# Patient Record
Sex: FEMALE | Race: WHITE | URBAN - NONMETROPOLITAN AREA
[De-identification: names, ages, dates, MRNs, and addresses within clinical notes are randomized per-mention and may not be internally consistent; named-entity substitution may affect disease eponyms.]

---

## 2020-10-27 ENCOUNTER — OFFICE VISIT (OUTPATIENT)
Dept: OBGYN CLINIC | Age: 30
End: 2020-10-27
Payer: COMMERCIAL

## 2020-10-27 VITALS
HEIGHT: 65 IN | BODY MASS INDEX: 34.49 KG/M2 | SYSTOLIC BLOOD PRESSURE: 102 MMHG | DIASTOLIC BLOOD PRESSURE: 60 MMHG | WEIGHT: 207 LBS

## 2020-10-27 PROCEDURE — 36415 COLL VENOUS BLD VENIPUNCTURE: CPT | Performed by: ADVANCED PRACTICE MIDWIFE

## 2020-10-27 PROCEDURE — 99203 OFFICE O/P NEW LOW 30 MIN: CPT | Performed by: ADVANCED PRACTICE MIDWIFE

## 2020-10-27 ASSESSMENT — ENCOUNTER SYMPTOMS
GASTROINTESTINAL NEGATIVE: 1
EYES NEGATIVE: 1
RESPIRATORY NEGATIVE: 1

## 2020-10-27 NOTE — PROGRESS NOTES
PROBLEM VISIT     Date of service: 10/27/2020    Cortney Hannah  Is a 26 y.o.  female    PT's PCP is: No primary care provider on file. : 1990                                          Review of Systems   Constitutional: Negative. HENT: Negative. Eyes: Negative. Respiratory: Negative. Cardiovascular: Negative. Gastrointestinal: Negative. Endocrine: Negative. Genitourinary: Negative. Musculoskeletal: Negative. Neurological: Negative. Psychiatric/Behavioral: Negative. Patient's last menstrual period was 10/09/2020. OB History    Para Term  AB Living   1       1     SAB TAB Ectopic Molar Multiple Live Births   1                # Outcome Date GA Lbr Anson/2nd Weight Sex Delivery Anes PTL Lv   1 SAB 2006 8w0d       ND        Social History     Tobacco Use   Smoking Status Current Every Day Smoker    Packs/day: 1.50    Years: 15.00    Pack years: 22.50   Smokeless Tobacco Never Used        Social History     Substance and Sexual Activity   Alcohol Use Yes    Comment: social- weekends       Allergies: Penicillins      Current Outpatient Medications:     EVENING PRIMROSE OIL PO, Take by mouth, Disp: , Rfl:     Prenatal Vit-Fe Fumarate-FA (PRENATAL VITAMIN PO), Take by mouth, Disp: , Rfl:     Social History     Substance and Sexual Activity   Sexual Activity Yes       Last Yearly:  2017    Last pap: 2016    Last HPV: 2016    Chief Complaint   Patient presents with   [facility]     Patient had HSG 2018. Had femara and trigger shot  and IUI with [place] 2018. Patient states that the IUI with all the meds was $800 and didnt want to do that every month. States she hadnt had labs done in a while, wanted to see other options like meds and timed intercourse or trigger shot and timed intercourse. Doing ovulation testing at home as well. Physical Exam  Constitutional:       Appearance: Normal appearance. HENT:      Head: Normocephalic. Eyes:      Pupils: Pupils are equal, round, and reactive to light. Neck:      Musculoskeletal: Normal range of motion. Pulmonary:      Effort: Pulmonary effort is normal.   Musculoskeletal: Normal range of motion. Neurological:      Mental Status: She is alert and oriented to person, place, and time. Skin:     General: Skin is warm and dry. Psychiatric:         Mood and Affect: Mood normal.         Behavior: Behavior normal.   Vitals signs and nursing note reviewed. Vital Signs  Blood pressure 102/60, height 5' 5\" (1.651 m), weight 207 lb (93.9 kg), last menstrual period 10/09/2020. HPI Desires w/u for infertility. Initial w/u was done in 2017 - labs showed IR and patient desired referral and eval with [facility]. Went and did one round of medications, trigger shot, IUI and \"just to expensive\" so waited a while - still no pregnancy. Had HSG in 2018 which showed bilateral tubes patent. Yes PT denies fever, chills, nausea and vomiting       Assessment and Plan          Diagnosis Orders   1. Infertility, female  Progesterone    Anti Mullerian Hormone    Follicle Stimulating Hormone    Luteinizing Hormone   2. Screening for thyroid disorder  TSH with Reflex   3. Metabolic syndrome  Insulin, Total    Glucose Tolerance, 2 Hours    Hemoglobin A1C   4. Tobacco use       Reports with results prior - showing IR - never started Glucophage. RE did semen analysis for partner - showed \"tad low speed and motility but not to concerned\"  Using OTC ovaboost and fertiliaid and EPO for she and spouse. Also using OTC \"proof test\" which is ovulation predictor test\". Tracking cycles and they are every every 27-29 days, not painful, last 3-4 days for menses. LMP was 10/9. Spouse is 35, has a healthy 15year old. She and partner both smoke 1-1.5ppd (recommended cessation and discussed risks of tobacco use in pregnancy in addition to effects on fertility).   Partner drinks 4-5 ETOH daily - reviewed effects on health and fertility, patient social drinker  Discussed repeat labs - has not had any since 2017. Patient aware of timed labs and that IR panel is fasting. Discussed possible treatment options but will not proceed with infertility eval and treatment until has annual - has hx of abn pap with LEEP and needs f/u. New patient karinailly to office as not seen since 2/2017. Discussed return day 21 of cycle for labs 10/29/2020 - Progesterone, TSH, AMH, IR panel, A1C. Call day 1 of menses to schedule a day 96-92 follicle scan with f/u with me to review further plan  Return day 3-5 of next cycle for FSH/LH  Discussed possible use of metformin, clomid, femara, progesterone, trigger shot. Will not perform IUI in office. Patient is agreeable to this plan. I am having Cortney ZAVALAKristan Camden maintain her EVENING PRIMROSE OIL PO and Prenatal Vit-Fe Fumarate-FA (PRENATAL VITAMIN PO). Return in about 1 month (around 11/27/2020) for Yearly. There are no Patient Instructions on file for this visit. Over 50% of time spent on counseling and care coordination on: see assessment and plan,  She was also counseled on her preventative health maintenance recommendations and follow-up.         FF time: 30 min      CELSA BECERRA,10/27/2020 3:32 PM       Electronically signed by DEEPTI Marcos CNM

## 2020-10-29 ENCOUNTER — HOSPITAL ENCOUNTER (OUTPATIENT)
Age: 30
Setting detail: SPECIMEN
Discharge: HOME | End: 2020-10-29
Payer: COMMERCIAL

## 2020-10-29 LAB
ESTIMATED AVERAGE GLUCOSE: 108 MG/DL
FOLLICLE STIMULATING HORMONE: 2 U/L (ref 1.7–21.5)
GLUCOSE BLD-MCNC: 77 MG/DL (ref 70–99)
GLUCOSE TOLERANCE TEST 2 HOUR: 101 MG/DL (ref 60–140)
HBA1C MFR BLD: 5.4 % (ref 4–6)
INSULIN COMMENT: NORMAL
INSULIN COMMENT: NORMAL
INSULIN REFERENCE RANGE:: NORMAL
INSULIN REFERENCE RANGE:: NORMAL
INSULIN: 13.4 MU/L
INSULIN: 60.1 MU/L
PROGESTERONE LEVEL: 9.54 NG/ML
TSH SERPL DL<=0.05 MIU/L-ACNC: 1.12 MIU/L (ref 0.3–5)

## 2020-11-01 LAB — ANTI-MULLERIAN HORMONE: 3.8 NG/ML (ref 0.18–11.71)

## 2020-11-09 ENCOUNTER — HOSPITAL ENCOUNTER (OUTPATIENT)
Age: 30
Setting detail: SPECIMEN
Discharge: HOME | End: 2020-11-09
Payer: COMMERCIAL

## 2020-11-09 LAB — LH: 7.6 U/L (ref 1–95.6)

## 2020-11-12 ENCOUNTER — TELEPHONE (OUTPATIENT)
Dept: OBGYN CLINIC | Age: 30
End: 2020-11-12

## 2020-11-16 ENCOUNTER — OFFICE VISIT (OUTPATIENT)
Dept: OBGYN CLINIC | Age: 30
End: 2020-11-16
Payer: COMMERCIAL

## 2020-11-16 ENCOUNTER — TELEPHONE (OUTPATIENT)
Dept: OBGYN CLINIC | Age: 30
End: 2020-11-16

## 2020-11-16 VITALS — BODY MASS INDEX: 34.68 KG/M2 | SYSTOLIC BLOOD PRESSURE: 118 MMHG | DIASTOLIC BLOOD PRESSURE: 80 MMHG | WEIGHT: 208.4 LBS

## 2020-11-16 PROCEDURE — 99213 OFFICE O/P EST LOW 20 MIN: CPT | Performed by: NURSE PRACTITIONER

## 2020-11-16 ASSESSMENT — PATIENT HEALTH QUESTIONNAIRE - PHQ9
SUM OF ALL RESPONSES TO PHQ9 QUESTIONS 1 & 2: 0
2. FEELING DOWN, DEPRESSED OR HOPELESS: 0
1. LITTLE INTEREST OR PLEASURE IN DOING THINGS: 0
SUM OF ALL RESPONSES TO PHQ QUESTIONS 1-9: 0

## 2020-11-16 NOTE — PROGRESS NOTES
PROBLEM VISIT     Date of service: 2020    Cortney Hannah  Is a 31 y.o.  female    PT's PCP is: No primary care provider on file. : 1990                                             Subjective:       Patient's last menstrual period was 2020 (exact date). OB History    Para Term  AB Living   1       1     SAB TAB Ectopic Molar Multiple Live Births   1                # Outcome Date GA Lbr Anson/2nd Weight Sex Delivery Anes PTL Lv   1 SAB 2006 8w0d       ND        Social History     Tobacco Use   Smoking Status Current Every Day Smoker    Packs/day: 1.50    Years: 15.00    Pack years: 22.50   Smokeless Tobacco Never Used        Social History     Substance and Sexual Activity   Alcohol Use Yes    Comment: social- weekends       Allergies: Penicillins      Current Outpatient Medications:     metFORMIN (GLUCOPHAGE) 500 MG tablet, Take one pill by mouth daily for one week, increase to BID for 2nd week, increase to TID and continue at 3 weeks, Disp: 90 tablet, Rfl: 3    EVENING PRIMROSE OIL PO, Take by mouth, Disp: , Rfl:     Prenatal Vit-Fe Fumarate-FA (PRENATAL VITAMIN PO), Take by mouth, Disp: , Rfl:     Social History     Substance and Sexual Activity   Sexual Activity Yes         Chief Complaint   Patient presents with    Follow-up     Follow up follicle scan. PE:  Vital Signs  Blood pressure 118/80, weight 208 lb 6.4 oz (94.5 kg), last menstrual period 2020. HPI: patient here following midcycle follicle scan. PT denies fever, chills, nausea and vomiting       Review of Systems   Constitutional: Negative. Genitourinary: Negative. Physical Exam  Constitutional:       Appearance: Normal appearance. HENT:      Head: Normocephalic. Pulmonary:      Effort: Pulmonary effort is normal.   Musculoskeletal: Normal range of motion. Neurological:      General: No focal deficit present. Mental Status: She is alert.    Psychiatric:

## 2020-11-17 NOTE — TELEPHONE ENCOUNTER
Please call patient and let her know I discussed usn with Michael Enamorado today and she will review usn/ labs, POC with patient at her annual

## 2020-11-25 ENCOUNTER — HOSPITAL ENCOUNTER (OUTPATIENT)
Age: 30
Setting detail: SPECIMEN
Discharge: HOME | End: 2020-11-25
Payer: COMMERCIAL

## 2020-11-25 ENCOUNTER — OFFICE VISIT (OUTPATIENT)
Dept: OBGYN CLINIC | Age: 30
End: 2020-11-25
Payer: COMMERCIAL

## 2020-11-25 VITALS
SYSTOLIC BLOOD PRESSURE: 104 MMHG | DIASTOLIC BLOOD PRESSURE: 60 MMHG | HEIGHT: 65 IN | WEIGHT: 203 LBS | BODY MASS INDEX: 33.82 KG/M2

## 2020-11-25 PROCEDURE — 99213 OFFICE O/P EST LOW 20 MIN: CPT | Performed by: ADVANCED PRACTICE MIDWIFE

## 2020-11-25 PROCEDURE — 99395 PREV VISIT EST AGE 18-39: CPT | Performed by: ADVANCED PRACTICE MIDWIFE

## 2020-11-25 ASSESSMENT — ENCOUNTER SYMPTOMS
GASTROINTESTINAL NEGATIVE: 1
RESPIRATORY NEGATIVE: 1

## 2020-11-25 NOTE — PROGRESS NOTES
YEARLY PHYSICAL    Date of service: 2020    Cortney Hannah  Is a 31 y.o.   female    PT's PCP is: No primary care provider on file. : 1990                                             Subjective:       Patient's last menstrual period was 2020 (exact date).      Are your menses regular: yes    OB History    Para Term  AB Living   1       1     SAB TAB Ectopic Molar Multiple Live Births   1                # Outcome Date GA Lbr Anson/2nd Weight Sex Delivery Anes PTL Lv   1 SAB 2006 8w0d       ND        Social History     Tobacco Use   Smoking Status Current Every Day Smoker    Packs/day: 1.50    Years: 15.00    Pack years: 22.50   Smokeless Tobacco Never Used        Social History     Substance and Sexual Activity   Alcohol Use Yes    Comment: social- weekends       Family History   Problem Relation Age of Onset    Cervical Cancer Mother     Depression Mother     Mental Illness Mother     Stroke Paternal Grandfather     Heart Attack Paternal Uncle        Any family history of breast or ovarian cancer: No    Any family history of blood clots: No      Allergies: Penicillins      Current Outpatient Medications:     metFORMIN (GLUCOPHAGE) 500 MG tablet, Take one pill by mouth daily for one week, increase to BID for 2nd week, increase to TID and continue at 3 weeks, Disp: 90 tablet, Rfl: 3    EVENING PRIMROSE OIL PO, Take by mouth, Disp: , Rfl:     Prenatal Vit-Fe Fumarate-FA (PRENATAL VITAMIN PO), Take by mouth, Disp: , Rfl:     Social History     Substance and Sexual Activity   Sexual Activity Yes       Any bleeding or pain with intercourse: No    Last Yearly:  Overdue    Last pap: Due today    Last HPV: Due today    Last Mammogram: n/a    Last Dexascan n/a     Last colorectal screen- type: n/a    Do you do self breast exams: No    Past Medical History:   Diagnosis Date    Abnormal Pap smear of cervix LSIL 12/2016, COLPO 1/2017 SHARI II, LEEP 2/2017 HGSIL    Anxiety     Depression     HGSIL on cytologic smear of cervix     Infertility, female     Insulin resistance        Past Surgical History:   Procedure Laterality Date    DILATION AND CURETTAGE  2016    HYSTEROSCOPY  2016    LEEP         Family History   Problem Relation Age of Onset    Cervical Cancer Mother     Depression Mother     Mental Illness Mother     Stroke Paternal Grandfather     Heart Attack Paternal Uncle        Chief Complaint   Patient presents with    Annual Exam     Annual, Pap due. Also had USN and labs done previously. Labs:    No results found for this visit on 11/25/20. HPI: Denies breast/pelvic concerns. Has been TTC - need to review labs and USN today regarding this process     Review of Systems   Constitutional: Negative. HENT: Negative. Respiratory: Negative. Cardiovascular: Negative. Gastrointestinal: Negative. Endocrine: Negative. Genitourinary: Negative for menstrual problem and pelvic pain. Been TTC for extended time frame   Musculoskeletal: Negative. Skin: Negative. Psychiatric/Behavioral: Negative. Objective  Blood pressure 104/60, height 5' 5\" (1.651 m), weight 203 lb (92.1 kg), last menstrual period 11/06/2020. Physical Exam  Constitutional:       Appearance: Normal appearance. Genitourinary:      Pelvic exam was performed with patient in the lithotomy position. Vulva, inguinal canal, urethra, bladder, vagina, right adnexa and left adnexa normal.      No vaginal discharge or tenderness. Cervical friability present. No cervical motion tenderness or erythema. Uterus is regular. HENT:      Head: Normocephalic. Eyes:      Pupils: Pupils are equal, round, and reactive to light. Neck:      Musculoskeletal: Normal range of motion. No muscular tenderness. Cardiovascular:      Rate and Rhythm: Normal rate and regular rhythm.       Pulses: Normal pulses. Heart sounds: Normal heart sounds. No murmur. Pulmonary:      Effort: Pulmonary effort is normal.      Breath sounds: Normal breath sounds. No wheezing. Chest:      Breasts:         Right: Normal.         Left: Normal.   Abdominal:      Palpations: Abdomen is soft. Tenderness: There is no abdominal tenderness. Musculoskeletal: Normal range of motion. Neurological:      Mental Status: She is alert and oriented to person, place, and time. Skin:     General: Skin is warm and dry. Psychiatric:         Mood and Affect: Mood normal.         Behavior: Behavior normal.   Vitals signs and nursing note reviewed. Assessment and Plan          Diagnosis Orders   1. Encounter for gynecological examination with abnormal finding     2. Metabolic syndrome     3. Anovulation          Repeat Annual every 1 year  Cervical Cytology Evaluation begins at 24years old. If Negative Cytology, Follow-up screening per current guidelines. Mammograms every 1year. If 35 yo and last mammogram was negative. Calcium and Vitamin D dosing reviewed. Colonoscopy screening reviewed as well as onset for bone density testing. Birth control and barrier recommendationsdiscussed. STD counseling and prevention reviewed. Gardisil counseling completed for all patients. Routine healthmaintenance per patients PCP. Spent additional time regarding infertility w/u and results of lab and USN. Discussed elevated LH/FSH ratio and elevated insulin levels - does have normal A1C. Discussed metabolic syndrome - insulin resistance and highly suspect PCOS. Recommended to continue metformin and follow diabetic diet in addition to exercise regimen. Discussed progesterone level <10 but borderline - recommended use of vaginal supp (LUCAS Tadeo MA called in progesterone supp vaginally 100mg qhs cycle day #16 till menses onset or if becomes pregnant then through first trimester - 2 refills also given).   Discussed if no pregnancy with next cycle then call for initial trial of Clomid (no femara as lining was >5mm) and mid cycle follicle scan. She verbalized understanding. I am having Cortney Hannah maintain her EVENING PRIMROSE OIL PO, Prenatal Vit-Fe Fumarate-FA (PRENATAL VITAMIN PO), and metFORMIN. Return in about 1 year (around 11/25/2021) for Yearly. There are no Patient Instructions on file for this visit. Over 50% of time spent on counseling and care coordination on: see assessment and plan,  She was also counseled on her preventative health maintenance recommendations and follow-up.         FF time: 15 min      CELSA BECERRA,11/25/2020 11:17 AM

## 2020-11-25 NOTE — PROGRESS NOTES
Chaperone for Intimate Exam   Chaperone was offered and accepted as part of the rooming process.    Chaperone: Nabil Louise

## 2020-12-04 LAB
HPV SOURCE: NORMAL
HPV, GENOTYPE 16: NOT DETECTED
HPV, GENOTYPE 18: NOT DETECTED
HPV, HIGH RISK OTHER: NOT DETECTED

## 2020-12-07 ENCOUNTER — TELEPHONE (OUTPATIENT)
Dept: OBGYN CLINIC | Age: 30
End: 2020-12-07

## 2020-12-07 NOTE — TELEPHONE ENCOUNTER
Patient called stating that she started her cycle on 12/5 and needs to get started on her Clomid. Per Dorota's last note, we will get Clomid called in and do a mid-cycle usn. Clomid to go to [facility] in [place]. According to her froilan, she should ovulate on 12/16, so we scheduled her usn on 24/61 to verify follicle. Patient verbalized understanding, no further questions/concerns voiced.

## 2020-12-08 LAB — CYTOLOGY REPORT: NORMAL

## 2020-12-09 RX ORDER — METRONIDAZOLE 500 MG/1
500 TABLET ORAL 2 TIMES DAILY
Qty: 14 TABLET | Refills: 0 | Status: SHIPPED | OUTPATIENT
Start: 2020-12-09 | End: 2020-12-16

## 2020-12-14 ENCOUNTER — TELEPHONE (OUTPATIENT)
Dept: OBGYN CLINIC | Age: 30
End: 2020-12-14

## 2020-12-15 ENCOUNTER — OFFICE VISIT (OUTPATIENT)
Dept: OBGYN CLINIC | Age: 30
End: 2020-12-15
Payer: COMMERCIAL

## 2020-12-15 VITALS
HEIGHT: 65 IN | SYSTOLIC BLOOD PRESSURE: 124 MMHG | WEIGHT: 205 LBS | BODY MASS INDEX: 34.16 KG/M2 | DIASTOLIC BLOOD PRESSURE: 70 MMHG

## 2020-12-15 PROCEDURE — 99213 OFFICE O/P EST LOW 20 MIN: CPT | Performed by: ADVANCED PRACTICE MIDWIFE

## 2020-12-15 ASSESSMENT — ENCOUNTER SYMPTOMS
RESPIRATORY NEGATIVE: 1
GASTROINTESTINAL NEGATIVE: 1

## 2020-12-15 NOTE — PROGRESS NOTES
PROBLEM VISIT     Date of service: 12/15/2020    Cortney Hannah  Is a 31 y.o.  female    PT's PCP is: No primary care provider on file. : 1990                                          Review of Systems   Constitutional: Negative. HENT: Negative. Respiratory: Negative. Cardiovascular: Negative. Gastrointestinal: Negative. Genitourinary: Negative. Psychiatric/Behavioral: Negative. No LMP recorded. OB History    Para Term  AB Living   1       1     SAB TAB Ectopic Molar Multiple Live Births   1                # Outcome Date GA Lbr Anson/2nd Weight Sex Delivery Anes PTL Lv   1 SAB 2006 8w0d       ND        Social History     Tobacco Use   Smoking Status Current Every Day Smoker    Packs/day: 1.50    Years: 15.00    Pack years: 22.50   Smokeless Tobacco Never Used        Social History     Substance and Sexual Activity   Alcohol Use Yes    Comment: social- weekends       Allergies: Penicillins      Current Outpatient Medications:     PROGESTERONE MICRONIZED PO, Take by mouth, Disp: , Rfl:     metroNIDAZOLE (FLAGYL) 500 MG tablet, Take 1 tablet by mouth 2 times daily for 7 days, Disp: 14 tablet, Rfl: 0    clomiPHENE (CLOMID) 50 MG tablet, Take 1 tablet by mouth daily One Po daily cycle day 3-7, Disp: 5 tablet, Rfl: 3    metFORMIN (GLUCOPHAGE) 500 MG tablet, Take one pill by mouth daily for one week, increase to BID for 2nd week, increase to TID and continue at 3 weeks, Disp: 90 tablet, Rfl: 3    EVENING PRIMROSE OIL PO, Take by mouth, Disp: , Rfl:     Prenatal Vit-Fe Fumarate-FA (PRENATAL VITAMIN PO), Take by mouth, Disp: , Rfl:     Social History     Substance and Sexual Activity   Sexual Activity Yes       Chief Complaint   Patient presents with    Infertility     Patient here for USN results today. Had labs done 2 cycles ago.  States she did get the call about Flagyl but did not  , wanted to make sure it would not interfere with anything else we are doing right now. Physical Exam  Constitutional:       Appearance: Normal appearance. She is normal weight. HENT:      Head: Normocephalic. Eyes:      Pupils: Pupils are equal, round, and reactive to light. Neck:      Musculoskeletal: Normal range of motion. Pulmonary:      Effort: Pulmonary effort is normal.   Musculoskeletal: Normal range of motion. Neurological:      Mental Status: She is alert and oriented to person, place, and time. Skin:     General: Skin is warm and dry. Psychiatric:         Behavior: Behavior normal.   Vitals signs and nursing note reviewed. Vital Signs  Blood pressure 124/70, height 5' 5\" (1.651 m), weight 205 lb (93 kg). HPI USN f/u. S/P Clomid 50mg PO this month. Also continues progesterone and metformin. Results reviewed today:    Discussed USN findings for cycle day 11. Endometrium 6mm, follicle 9.7PG on the right and 1.8cm on the left. Assessment and Plan          Diagnosis Orders   1. Encounter for general counseling and advice on procreation         Discussed USN findings and that left ovary has dominant follicle. Recommended timed intercourse this cycle. If no pregnancy then call day 1 of menses for clomid 50mg to be called and schedule another mid cycle USN. If adequate follicle then trigger shot and timed intercourse. If still no pregnancy then will increase Clomid dose. Patient is agreeable. Will call day 1 of menses or if positive HPT for HCG x2       I am having Cortney DAVID Hannah maintain her EVENING PRIMROSE OIL PO, Prenatal Vit-Fe Fumarate-FA (PRENATAL VITAMIN PO), metFORMIN, clomiPHENE, metroNIDAZOLE, and PROGESTERONE MICRONIZED PO. Return if symptoms worsen or fail to improve, for Pt to call if starts menses or has positive pregnancy test.    There are no Patient Instructions on file for this visit.     Over 50% of time spent on counseling and care coordination on: see assessment and plan, She was also counseled on her preventative health maintenance recommendations and follow-up.         FF time: 15 min      CELSA BECERRA,12/15/2020 11:44 AM       Electronically signed by Julieanne Sicard, APRN - CNM

## 2020-12-15 NOTE — PROGRESS NOTES
Refills called in for progesterone supp. Patient came back in after appt to let us know they had sent her some in the mail that never made it to her and she just got her last #20 pills. Per BR ok to send in #30 and 3 refills. Called in.

## 2021-01-06 ENCOUNTER — TELEPHONE (OUTPATIENT)
Dept: OBGYN CLINIC | Age: 31
End: 2021-01-06

## 2021-01-06 DIAGNOSIS — N91.2 AMENORRHEA: Primary | ICD-10-CM

## 2021-01-06 NOTE — TELEPHONE ENCOUNTER
Pt is actually 6 days later on menses and having some brown/red spotting, only noticeable when wiping. OK for HCG per SELENA Stark. Pt cannot make it in today and will be in tomorrow to have drawn.

## 2021-01-06 NOTE — TELEPHONE ENCOUNTER
Pt calling stating she is 6 days s/p taking clomid and is now having vaginal bleeding. Pt wondering what to do now. Please advise.

## 2021-01-07 ENCOUNTER — HOSPITAL ENCOUNTER (OUTPATIENT)
Age: 31
Setting detail: SPECIMEN
Discharge: HOME | End: 2021-01-07
Payer: COMMERCIAL

## 2021-01-07 ENCOUNTER — TELEPHONE (OUTPATIENT)
Dept: OBGYN CLINIC | Age: 31
End: 2021-01-07

## 2021-01-07 DIAGNOSIS — N91.2 AMENORRHEA: ICD-10-CM

## 2021-01-07 LAB — HCG QUANTITATIVE: <1 IU/L

## 2021-01-07 NOTE — TELEPHONE ENCOUNTER
Pt. Called for results of HCG. I informed her that it was negative. I was unable to document on the lab since the result was routed to Maisha Fallon.

## 2021-01-14 ENCOUNTER — TELEPHONE (OUTPATIENT)
Dept: OBGYN CLINIC | Age: 31
End: 2021-01-14

## 2021-01-18 ENCOUNTER — NURSE ONLY (OUTPATIENT)
Dept: OBGYN CLINIC | Age: 31
End: 2021-01-18
Payer: COMMERCIAL

## 2021-01-18 ENCOUNTER — OFFICE VISIT (OUTPATIENT)
Dept: OBGYN CLINIC | Age: 31
End: 2021-01-18
Payer: COMMERCIAL

## 2021-01-18 VITALS
WEIGHT: 206.2 LBS | BODY MASS INDEX: 34.35 KG/M2 | DIASTOLIC BLOOD PRESSURE: 70 MMHG | SYSTOLIC BLOOD PRESSURE: 120 MMHG | HEIGHT: 65 IN

## 2021-01-18 DIAGNOSIS — Z31.49 PROCREATION MANAGEMENT INVESTIGATION AND TESTING: Primary | ICD-10-CM

## 2021-01-18 DIAGNOSIS — N97.9 FEMALE INFERTILITY, UNSPECIFIED: ICD-10-CM

## 2021-01-18 PROCEDURE — 96372 THER/PROPH/DIAG INJ SC/IM: CPT | Performed by: ADVANCED PRACTICE MIDWIFE

## 2021-01-18 PROCEDURE — 99213 OFFICE O/P EST LOW 20 MIN: CPT | Performed by: ADVANCED PRACTICE MIDWIFE

## 2021-01-18 RX ORDER — CHORIONIC GONADOTROPIN 10000 UNIT
1 KIT INTRAMUSCULAR ONCE
Status: COMPLETED | OUTPATIENT
Start: 2021-01-18 | End: 2021-01-18

## 2021-01-18 RX ADMIN — CHORIONIC GONADOTROPIN 1 ML: KIT at 11:00

## 2021-01-18 ASSESSMENT — ENCOUNTER SYMPTOMS
RESPIRATORY NEGATIVE: 1
GASTROINTESTINAL NEGATIVE: 1

## 2021-01-18 NOTE — PROGRESS NOTES
PROBLEM VISIT     Date of service: 2021    Cortney Hannah  Is a 31 y.o.  female    PT's PCP is: No primary care provider on file. : 1990                                          Review of Systems   Constitutional: Negative. Respiratory: Negative. Cardiovascular: Negative. Gastrointestinal: Negative. Genitourinary: Positive for menstrual problem (Last cycle was irreg - had neg SPT). Musculoskeletal: Negative. Patient's last menstrual period was 2021. OB History    Para Term  AB Living   1       1     SAB TAB Ectopic Molar Multiple Live Births   1                # Outcome Date GA Lbr Anson/2nd Weight Sex Delivery Anes PTL Lv   1 SAB 2006 8w0d       ND        Social History     Tobacco Use   Smoking Status Current Every Day Smoker    Packs/day: 1.50    Years: 15.00    Pack years: 22.50   Smokeless Tobacco Never Used        Social History     Substance and Sexual Activity   Alcohol Use Yes    Comment: social- weekends       Allergies: Penicillins      Current Outpatient Medications:     PROGESTERONE MICRONIZED PO, Take by mouth, Disp: , Rfl:     clomiPHENE (CLOMID) 50 MG tablet, Take 1 tablet by mouth daily One Po daily cycle day 3-7, Disp: 5 tablet, Rfl: 3    metFORMIN (GLUCOPHAGE) 500 MG tablet, Take one pill by mouth daily for one week, increase to BID for 2nd week, increase to TID and continue at 3 weeks, Disp: 90 tablet, Rfl: 3    EVENING PRIMROSE OIL PO, Take by mouth, Disp: , Rfl:     Prenatal Vit-Fe Fumarate-FA (PRENATAL VITAMIN PO), Take by mouth, Disp: , Rfl:     Social History     Substance and Sexual Activity   Sexual Activity Yes       Chief Complaint   Patient presents with    Follow-up     Pt here for USN f/u. Pt denies concerns. Physical Exam  Constitutional:       Appearance: Normal appearance. She is normal weight. HENT:      Head: Normocephalic.    Eyes:      Pupils: Pupils are equal, round, and reactive to light.   Neck:      Musculoskeletal: Normal range of motion. Pulmonary:      Effort: Pulmonary effort is normal.   Musculoskeletal: Normal range of motion. Neurological:      Mental Status: She is alert and oriented to person, place, and time. Skin:     General: Skin is warm and dry. Psychiatric:         Mood and Affect: Mood normal.         Behavior: Behavior normal.   Vitals signs and nursing note reviewed. Vital Signs  Blood pressure 120/70, height 5' 5\" (1.651 m), weight 206 lb 3.2 oz (93.5 kg), last menstrual period 01/06/2021. HPI USN f/u. Mid cycle follicle scan. S/P clomid 50mg and using metformin. Last cycle was irreg - neg SPT therefore repeated Clomid. Results reviewed today:    USN today reveals cycle day 13, endometrium 9mm, follicle on the right 2.4WC, follicle on the left 7.6FU at greatest dimensions. Assessment and Plan          Diagnosis Orders   1. Procreation management investigation and testing       Reviewed follicle scan today with dominant follicle x2. Discussed use of trigger shot and timed intercourse - patient would like to proceed. Attempted to call trigger shot into [facility] - back ordered and cannot get. Patient aware may not be able to receive trigger shot this month and to continue with timed intercourse. If able to order it then we will call patient. She was agreeable. I am having Cortney Hannah maintain her EVENING PRIMROSE OIL PO, Prenatal Vit-Fe Fumarate-FA (PRENATAL VITAMIN PO), metFORMIN, clomiPHENE, and PROGESTERONE MICRONIZED PO. No follow-ups on file. She was also counseled on her preventative health maintenance recommendations and follow-up. There are no Patient Instructions on file for this visit.     Ashleigh BECERRA,1/18/2021 9:58 AM                   Electronically signed by DEEPTI Flynn CNM

## 2021-02-10 ENCOUNTER — TELEPHONE (OUTPATIENT)
Dept: OBGYN CLINIC | Age: 31
End: 2021-02-10

## 2021-02-10 NOTE — TELEPHONE ENCOUNTER
Spoke to Kim and they do not. There is some infertility medication information in your basket on your desk.   You fill out the form and fax it in and they had HCG on the list.

## 2021-02-10 NOTE — TELEPHONE ENCOUNTER
Oh Portillo - can you call [facility] and find out if have any trigger shots just in case.   Thanks

## 2021-02-10 NOTE — TELEPHONE ENCOUNTER
Pt. Called. She is currently cycle day 7. She started her period on Thursday and forgot that the office was closed on fridays. She was supposed to call and get rx for clomid 100 mg. She had 50 mg so just used those instead this month. She is wanting to see if she can get another trigger shot this month. I scheduled her for follicle scan for Monday which will be cycle day 12 and squeezed her in for f/u with SELENA Stark after.

## 2021-02-11 ENCOUNTER — TELEPHONE (OUTPATIENT)
Dept: OBGYN CLINIC | Age: 31
End: 2021-02-11

## 2021-02-11 NOTE — TELEPHONE ENCOUNTER
Attempted to leave a voicemail to remind patient of ultrasound at 8:00am on 2/15/21. Voicemail box was full.

## 2021-03-02 ENCOUNTER — TELEPHONE (OUTPATIENT)
Dept: OBGYN CLINIC | Age: 31
End: 2021-03-02

## 2021-03-04 DIAGNOSIS — N97.9 FEMALE INFERTILITY, UNSPECIFIED: Primary | ICD-10-CM

## 2021-03-17 ENCOUNTER — TELEPHONE (OUTPATIENT)
Dept: OBGYN CLINIC | Age: 31
End: 2021-03-17

## 2021-03-18 ENCOUNTER — TELEPHONE (OUTPATIENT)
Dept: OBGYN CLINIC | Age: 31
End: 2021-03-18

## 2021-11-30 ENCOUNTER — OFFICE VISIT (OUTPATIENT)
Dept: OBGYN CLINIC | Age: 31
End: 2021-11-30
Payer: COMMERCIAL

## 2021-11-30 VITALS
WEIGHT: 208.6 LBS | HEIGHT: 65 IN | SYSTOLIC BLOOD PRESSURE: 110 MMHG | BODY MASS INDEX: 34.75 KG/M2 | DIASTOLIC BLOOD PRESSURE: 70 MMHG

## 2021-11-30 DIAGNOSIS — Z12.72 SMEAR, VAGINAL, AS PART OF ROUTINE GYNECOLOGICAL EXAMINATION: Primary | ICD-10-CM

## 2021-11-30 DIAGNOSIS — Z01.419 SMEAR, VAGINAL, AS PART OF ROUTINE GYNECOLOGICAL EXAMINATION: Primary | ICD-10-CM

## 2021-11-30 PROCEDURE — 99395 PREV VISIT EST AGE 18-39: CPT | Performed by: ADVANCED PRACTICE MIDWIFE

## 2021-11-30 ASSESSMENT — ENCOUNTER SYMPTOMS
DIARRHEA: 0
SHORTNESS OF BREATH: 0
GASTROINTESTINAL NEGATIVE: 1
RESPIRATORY NEGATIVE: 1
ABDOMINAL PAIN: 0
CONSTIPATION: 0

## 2021-11-30 NOTE — PROGRESS NOTES
YEARLY PHYSICAL    Date of service: 2021    Cortney Hannah  Is a 27 y.o.   female    PT's PCP is: No primary care provider on file. : 1990                                             Subjective:       Patient's last menstrual period was 2021 (exact date). Are your menses regular: yes    OB History    Para Term  AB Living   1       1     SAB IAB Ectopic Molar Multiple Live Births   1                # Outcome Date GA Lbr Anson/2nd Weight Sex Delivery Anes PTL Lv   1 SAB 2006 8w0d       ND        Social History     Tobacco Use   Smoking Status Current Every Day Smoker    Packs/day: 1.00    Years: 15.00    Pack years: 15.00   Smokeless Tobacco Never Used        Social History     Substance and Sexual Activity   Alcohol Use Yes    Alcohol/week: 1.0 - 2.0 standard drink    Types: 1 - 2 Glasses of wine per week    Comment: social- weekends       Family History   Problem Relation Age of Onset    Cervical Cancer Mother     Depression Mother     Mental Illness Mother     Stroke Paternal Grandfather     Heart Attack Paternal Uncle        Any family history of breast or ovarian cancer: No    Any family history of blood clots: No      Allergies: Penicillins    No current outpatient medications on file.     Social History     Substance and Sexual Activity   Sexual Activity Yes    Partners: Male       Any bleeding or pain with intercourse: No    Last Yearly:  2020    Last pap: 2020 - normal    Do you do self breast exams: Encouraged    Past Medical History:   Diagnosis Date    Abnormal Pap smear of cervix     LSIL 2016, COLPO 2017 SHARI II, LEEP 2017 HGSIL    Anxiety     Depression     HGSIL on cytologic smear of cervix     Infertility, female     Insulin resistance        Past Surgical History:   Procedure Laterality Date    DILATION AND CURETTAGE  2016    HYSTEROSCOPY  2016    LEEP Family History   Problem Relation Age of Onset    Cervical Cancer Mother     Depression Mother     Mental Illness Mother     Stroke Paternal Grandfather     Heart Attack Paternal Uncle        Chief Complaint   Patient presents with    Annual Exam     Annual exam. Pap NL 11/25/20. Pt denies concerns. Denies breast/pelvic pain. Labs:    No results found for this visit on 11/30/21. HPI: Denies breast/pelvic concerns. Menses regular. Monogamous relationship. Pap UTD. Patient considering IVF in [place] next year    Review of Systems   Constitutional: Negative. Negative for chills, fatigue and fever. HENT: Negative. Respiratory: Negative. Negative for shortness of breath. Cardiovascular: Negative. Negative for chest pain. Gastrointestinal: Negative. Negative for abdominal pain, constipation and diarrhea. Genitourinary: Negative for dysuria, enuresis, frequency, menstrual problem, pelvic pain, urgency and vaginal bleeding. Musculoskeletal: Negative. Neurological: Negative. Negative for dizziness, light-headedness and headaches. Psychiatric/Behavioral: Negative. Objective  Blood pressure 110/70, height 5' 5\" (1.651 m), weight 208 lb 9.6 oz (94.6 kg), last menstrual period 11/11/2021. Physical Exam  Constitutional:       Appearance: Normal appearance. She is normal weight. Genitourinary:      Vulva, bladder and urethral meatus normal.      No vaginal discharge or tenderness. No vaginal prolapse present. Right Adnexa: not tender. Left Adnexa: not tender. No cervical motion tenderness or discharge. Uterus is not tender. Pelvic exam was performed with patient in the lithotomy position. Breasts: Breasts are symmetrical.      Breasts are soft. Right: No mass, nipple discharge, skin change or tenderness. Left: No mass, nipple discharge, skin change or tenderness. HENT:      Head: Normocephalic.    Eyes:      Pupils: Pupils

## 2022-01-05 ENCOUNTER — TELEPHONE (OUTPATIENT)
Dept: OBGYN CLINIC | Age: 32
End: 2022-01-05

## 2022-01-05 NOTE — TELEPHONE ENCOUNTER
Patient called. She said she is planning to do IVF later this year to conceive. She does not want to get covid vaccine while trying to get pregnant and asking if her work were to require it (they do not require yet) if we gave medical exemptions. Advised patient that it is on a case by case basis. If her work end up requiring it in the future she can call and let us know that and at that time we can check with provider.

## 2022-03-07 ENCOUNTER — TELEPHONE (OUTPATIENT)
Dept: OBGYN CLINIC | Age: 32
End: 2022-03-07

## 2022-03-07 NOTE — TELEPHONE ENCOUNTER
Patient calling because she is going to be scheduling an appt with fertility at an outside location and had question about billing and how it worked since she knows that the fertility is not covered by her insurance. I told her generally the fertility dr will order things to be done and send them to her or us and she can have them drawn here , and ultrasounds done here but they are the ordering for all of that. Got the number for pre-reg/pricing and let her know that if she gets the CPT codes for the labs she can check the pricing ahead of time since she had questions about that as well. number given to patient for pre reg- [phone].

## 2022-06-08 ENCOUNTER — TELEPHONE (OUTPATIENT)
Dept: OBGYN CLINIC | Age: 32
End: 2022-06-08

## 2022-06-08 NOTE — TELEPHONE ENCOUNTER
Patient called stating that she had her phone consult for IVF yesterday and they are recommending patient have labs drawn. She was questioning about getting the labs in our office. She needs to have done at [facility] per her insurance. She is aware that [employer] had [facility] and she can call them to verify. Per patient, prolactin, blood type, CBC, CMP, AMH, Rubella, vitamin D, progesterone, and A1C were ordered. She will also be getting an HCG/sonohyst, but hoping to have that done at the same time as egg retrieval while under anesthesia. She also states that metformin was discussed and she mentioned that she has stopped taking it. RE wants her to resume the metformin. Patient has labs drawn in 2020 for IR. She is questioning if she needs to have labs drawn again or just get a new Rx sent in. She is planning on doing labs on July 14th since she has the day off work and would get these done the same day. Can you please review and give recommendations?

## 2022-06-09 NOTE — TELEPHONE ENCOUNTER
I attempted to call patient and had to leave a message. Reviewed that Manav Pelaez would prefer RE to prescribe her metformin. If they will not, she can call the office.

## 2022-06-09 NOTE — TELEPHONE ENCOUNTER
Unfortunately if RE is wanting the labs and ordering then needs to use their order form for draw and also mgmt. I do not want to order them in our office as will not be responsible for mgmt. I would ask RE to order her metformin after the A1C is done - I would want her to repeat a 2 hour sugar test for insulin levels prior to restarting metformin since has been about 2 years.    I am sorry

## 2022-06-22 NOTE — TELEPHONE ENCOUNTER
Patient called stating that she is needing to schedule an HSG and SIS per the infertility specialist.  Her last HSG was 2018 and is now out of date. She has an order from RE to have this done. I gave her Marce [place] contact information at [facility] to schedule her HSG. We can try to schedule her SIS next month as she is already cycle day 3 and needs to be between cycle day 5-10. We have patient penciled in for 7/25 in the [place] office. Let her know that I need to confirm details for her to have procedure done in [place], but I would get all the details and call her back. Patient verbalized understanding, no further questions/concerns voiced.

## 2022-06-23 NOTE — TELEPHONE ENCOUNTER
Received confirmation from Tamara Salmeron in the [facility] office that we are able to schedule the Sonohyst on 7/25. Catheters will be sent to the [place] office via [facility]. I left a message for patient confirming all the details and reminding her to take Tylenol or motrin prior to the procedure. Patient is to call with any further questions/concerns.

## 2022-07-25 ENCOUNTER — PROCEDURE VISIT (OUTPATIENT)
Dept: OBGYN | Age: 32
End: 2022-07-25
Payer: COMMERCIAL

## 2022-07-25 DIAGNOSIS — N97.0 INFERTILITY ASSOCIATED WITH ANOVULATION: Primary | ICD-10-CM

## 2022-07-25 PROCEDURE — 58340 CATHETER FOR HYSTEROGRAPHY: CPT | Performed by: OBSTETRICS & GYNECOLOGY

## 2022-07-25 NOTE — PROGRESS NOTES
Department of Gynecology  Office Procedure Note          PROCEDURE PERFORMED:  Sonohysterogram    PRIMARY INDICATION:  anovulation - requested by outside RE    DIAGNOSTIC DATA REVIEW:  Radiology Review:  HSG this am norml    LOCAL ANESTHESIA:  None    STERILE DRESSINGS:  Not applicable    ESTIMATED BLOOD LOSS:  None    SPECIMENS:  none    COMPLICATIONS:  none    PRIMARY PROCEDURALIST:   ADAN Nguyễn DO    Procedure:  After obtaining initial usn images, the patient was placed into dorsal lithotomy position. An operative speculum was placed. The cervix was prepped with Betadine. The SIS catheter was threaded through the cervix without difficulty. The speculum was removed and the vaginal usn probe was placed. Approximately 20 cc of NS was slowly pushed into the uterine cavity while images were obtained. No obvious polyps, fibroids, or other abnormalities of the uterine cavity were noted. At the completion of the procedure the catheter was removed. The patient tolerated the procedure well.

## 2022-11-30 ENCOUNTER — PATIENT MESSAGE (OUTPATIENT)
Dept: OBGYN CLINIC | Age: 32
End: 2022-11-30

## 2022-12-01 NOTE — TELEPHONE ENCOUNTER
Spoke with patient and she thinks she should start her cycle on Monday. She is going to call on cycle day 1. Pt does not want to schedule at this time but will schedule when comes in for cycle day 7-9 USN and f/u.

## 2022-12-07 ENCOUNTER — OFFICE VISIT (OUTPATIENT)
Dept: OBGYN CLINIC | Age: 32
End: 2022-12-07
Payer: COMMERCIAL

## 2022-12-07 ENCOUNTER — HOSPITAL ENCOUNTER (OUTPATIENT)
Age: 32
Setting detail: SPECIMEN
Discharge: HOME | End: 2022-12-07

## 2022-12-07 VITALS
SYSTOLIC BLOOD PRESSURE: 118 MMHG | WEIGHT: 211.6 LBS | DIASTOLIC BLOOD PRESSURE: 80 MMHG | BODY MASS INDEX: 35.25 KG/M2 | HEIGHT: 65 IN

## 2022-12-07 DIAGNOSIS — Z13.29 SCREENING FOR THYROID DISORDER: ICD-10-CM

## 2022-12-07 DIAGNOSIS — N94.6 DYSMENORRHEA: ICD-10-CM

## 2022-12-07 DIAGNOSIS — R10.2 PELVIC PAIN: ICD-10-CM

## 2022-12-07 DIAGNOSIS — R53.83 OTHER FATIGUE: ICD-10-CM

## 2022-12-07 DIAGNOSIS — R10.11 RIGHT UPPER QUADRANT ABDOMINAL PAIN: ICD-10-CM

## 2022-12-07 DIAGNOSIS — N92.0 MENORRHAGIA WITH REGULAR CYCLE: ICD-10-CM

## 2022-12-07 DIAGNOSIS — Z01.411 ABNORMAL FEMALE PELVIC EXAM: Primary | ICD-10-CM

## 2022-12-07 LAB
ABSOLUTE EOS #: 0.04 K/UL (ref 0–0.44)
ABSOLUTE IMMATURE GRANULOCYTE: 0.05 K/UL (ref 0–0.3)
ABSOLUTE LYMPH #: 2.47 K/UL (ref 1.1–3.7)
ABSOLUTE MONO #: 0.72 K/UL (ref 0.1–1.2)
ALBUMIN SERPL-MCNC: 4.3 G/DL (ref 3.5–5.2)
ALBUMIN/GLOBULIN RATIO: 1.4 (ref 1–2.5)
ALP BLD-CCNC: 75 U/L (ref 35–104)
ALT SERPL-CCNC: 25 U/L (ref 5–33)
ANION GAP SERPL CALCULATED.3IONS-SCNC: 15 MMOL/L (ref 9–17)
AST SERPL-CCNC: 24 U/L
BASOPHILS # BLD: 1 % (ref 0–2)
BASOPHILS ABSOLUTE: 0.08 K/UL (ref 0–0.2)
BILIRUB SERPL-MCNC: 0.5 MG/DL (ref 0.3–1.2)
BUN BLDV-MCNC: 10 MG/DL (ref 6–20)
CALCIUM SERPL-MCNC: 9.4 MG/DL (ref 8.6–10.4)
CHLORIDE BLD-SCNC: 100 MMOL/L (ref 98–107)
CO2: 23 MMOL/L (ref 20–31)
CREAT SERPL-MCNC: 0.68 MG/DL (ref 0.5–0.9)
EOSINOPHILS RELATIVE PERCENT: 0 % (ref 1–4)
GFR SERPL CREATININE-BSD FRML MDRD: >60 ML/MIN/1.73M2
GLUCOSE BLD-MCNC: 88 MG/DL (ref 70–99)
HCT VFR BLD CALC: 46.6 % (ref 36.3–47.1)
HEMOGLOBIN: 15.5 G/DL (ref 11.9–15.1)
IMMATURE GRANULOCYTES: 0 %
LYMPHOCYTES # BLD: 21 % (ref 24–43)
MCH RBC QN AUTO: 33.3 PG (ref 25.2–33.5)
MCHC RBC AUTO-ENTMCNC: 33.3 G/DL (ref 28.4–34.8)
MCV RBC AUTO: 100.2 FL (ref 82.6–102.9)
MONOCYTES # BLD: 6 % (ref 3–12)
NRBC AUTOMATED: 0 PER 100 WBC
PDW BLD-RTO: 11.7 % (ref 11.8–14.4)
PLATELET # BLD: 404 K/UL (ref 138–453)
PMV BLD AUTO: 10.5 FL (ref 8.1–13.5)
POTASSIUM SERPL-SCNC: 4 MMOL/L (ref 3.7–5.3)
RBC # BLD: 4.65 M/UL (ref 3.95–5.11)
SEG NEUTROPHILS: 72 % (ref 36–65)
SEGMENTED NEUTROPHILS ABSOLUTE COUNT: 8.65 K/UL (ref 1.5–8.1)
SODIUM BLD-SCNC: 138 MMOL/L (ref 135–144)
TOTAL PROTEIN: 7.3 G/DL (ref 6.4–8.3)
TSH SERPL DL<=0.05 MIU/L-ACNC: 0.88 UIU/ML (ref 0.3–5)
VITAMIN B-12: 684 PG/ML (ref 232–1245)
VITAMIN D 25-HYDROXY: 29.2 NG/ML
WBC # BLD: 12 K/UL (ref 3.5–11.3)

## 2022-12-07 PROCEDURE — 99395 PREV VISIT EST AGE 18-39: CPT | Performed by: ADVANCED PRACTICE MIDWIFE

## 2022-12-07 ASSESSMENT — ENCOUNTER SYMPTOMS
RESPIRATORY NEGATIVE: 1
CONSTIPATION: 0
VOMITING: 0
DIARRHEA: 0
RECTAL PAIN: 0
NAUSEA: 1
BLOOD IN STOOL: 0
ABDOMINAL PAIN: 0
SHORTNESS OF BREATH: 0

## 2022-12-07 NOTE — PROGRESS NOTES
YEARLY PHYSICAL    Date of service: 2022    Cortney Hannah  Is a 34 y.o.   female    PT's PCP is: No primary care provider on file. : 1990                                             Subjective:       Patient's last menstrual period was 2022 (exact date).      Are your menses regular: yes    OB History    Para Term  AB Living   1       1     SAB IAB Ectopic Molar Multiple Live Births   1                # Outcome Date GA Lbr Anson/2nd Weight Sex Delivery Anes PTL Lv   1 SAB 2006 8w0d       ND        Social History     Tobacco Use   Smoking Status Every Day    Packs/day: 1.00    Years: 15.00    Pack years: 15.00    Types: Cigarettes   Smokeless Tobacco Never        Social History     Substance and Sexual Activity   Alcohol Use Yes    Alcohol/week: 1.0 - 2.0 standard drink    Types: 1 - 2 Glasses of wine per week    Comment: social- weekends       Family History   Problem Relation Age of Onset    Cervical Cancer Mother     Depression Mother     Mental Illness Mother     Stroke Paternal Grandfather     Heart Attack Paternal Uncle        Any family history of breast or ovarian cancer: No    Any family history of blood clots: No      Allergies: Penicillins      Current Outpatient Medications:     Prenatal MV-Min-Fe Fum-FA-DHA (PRENATAL 1 PO), Take by mouth, Disp: , Rfl:     Social History     Substance and Sexual Activity   Sexual Activity Yes    Partners: Male       Any bleeding or pain with intercourse: No    Last Yearly:      Last pap:  - normal    Last HPV: 2020 - negative    Do you do self breast exams: Encouraged    Past Medical History:   Diagnosis Date    Abnormal Pap smear of cervix     LSIL 2016, COLPO 2017 SHARI II, LEEP 2017 HGSIL    Anxiety     Depression     HGSIL on cytologic smear of cervix     Infertility, female     Insulin resistance        Past Surgical History:   Procedure Laterality Date    DILATION AND CURETTAGE  2016    HYSTEROSCOPY  2016    [facility]         Family History   Problem Relation Age of Onset    Cervical Cancer Mother     Depression Mother     Mental Illness Mother     Stroke Paternal Grandfather     Heart Attack Paternal Uncle        Chief Complaint   Patient presents with    Annual Exam     Pap NL 11/25/20. Dysmenorrhea     Pt c/o heavy painful cycles. Labs:    No results found for this visit on 12/07/22. HPI:  Annual.  Denies breast concerns. Pelvic concerns - gradual change to menses. Reports cycles have become heavier and more painful - has cramping week prior to cycle and during bleeding. Bleeds for 4 days. Clots present, vary in size. More fatigue with menses. Also has intermittent \"knife like pain\" to right \"love handle area\" and \"at same time I get pain in my upper right arm\" - also reports occ same pain in low pelvic region \"into my back\". Reports that this pain lasts only a few seconds and happens very randomly. Denies pain with sex or BM's. Nausea present with BM's. Breast pain at right aerola 2-3 months ago for few days - resolved now    Review of Systems   Constitutional: Negative. Negative for chills, fatigue and fever. HENT: Negative. Respiratory: Negative. Negative for shortness of breath. Cardiovascular: Negative. Negative for chest pain. Gastrointestinal:  Positive for nausea. Negative for abdominal pain, blood in stool, constipation, diarrhea, rectal pain and vomiting. Genitourinary:  Positive for menstrual problem and pelvic pain. Negative for dysuria, enuresis, frequency, urgency and vaginal bleeding. Musculoskeletal: Negative. Neurological: Negative. Negative for dizziness, light-headedness and headaches. Psychiatric/Behavioral: Negative. Objective  Blood pressure 118/80, height 5' 5\" (1.651 m), weight 211 lb 9.6 oz (96 kg), last menstrual period 12/07/2022.   Physical Exam  Constitutional: Appearance: Normal appearance. She is normal weight. Genitourinary:      Vulva, bladder and urethral meatus normal.      Vaginal bleeding (menses) present. No vaginal discharge or tenderness. No vaginal prolapse present. Right Adnexa: not tender. Left Adnexa: not tender. No cervical motion tenderness or discharge. Uterus is not tender. Pelvic exam was performed with patient in the lithotomy position. Breasts:     Breasts are symmetrical.      Breasts are soft. Right: No mass, nipple discharge, skin change or tenderness. Left: No mass, nipple discharge, skin change or tenderness. HENT:      Head: Normocephalic. Eyes:      Pupils: Pupils are equal, round, and reactive to light. Cardiovascular:      Rate and Rhythm: Normal rate and regular rhythm. Pulses: Normal pulses. Heart sounds: Normal heart sounds. No murmur heard. Pulmonary:      Effort: Pulmonary effort is normal.      Breath sounds: Normal breath sounds. No wheezing. Abdominal:      Palpations: Abdomen is soft. Tenderness: There is no abdominal tenderness. Musculoskeletal:         General: Normal range of motion. Cervical back: Normal range of motion. No muscular tenderness. Neurological:      Mental Status: She is alert and oriented to person, place, and time. Skin:     General: Skin is warm and dry. Psychiatric:         Behavior: Behavior normal.   Vitals and nursing note reviewed. Chaperone present: Declined. Assessment and Plan          Diagnosis Orders   1. Abnormal female pelvic exam        2. Screening for thyroid disorder  TSH with Reflex      3. Other fatigue  Vitamin D 25 Hydroxy    Vitamin B12      4. Menorrhagia with regular cycle        5. Pelvic pain        6. Right upper quadrant abdominal pain  CBC with Auto Differential    Comprehensive Metabolic Panel      7.  Dysmenorrhea          Repeat Annual every 1 year  Cervical Cytology Evaluation begins at 24years old. If Negative Cytology, Follow-up screening per current guidelines. Mammograms every 1year. If 35 yo and last mammogram was negative. Calcium and Vitamin D dosing reviewed. Birth control and barrier recommendationsdiscussed. STD counseling and prevention reviewed. Routine healthmaintenance per patients PCP. Discussed labs today, return for a day 7-9 USN. Discussed possible mgmt options including but not limited to OCP use, IUD, Depo, surgery. Patient understanding and agreeable        I am having Cortney Hannah maintain her Prenatal MV-Min-Fe Fum-FA-DHA (PRENATAL 1 PO). Return in about 1 year (around 12/7/2023) for Yearly. She was also counseled on her preventative health maintenance recommendations and follow-up. There are no Patient Instructions on file for this visit.     CELSA BECERRA, DEEPTI - CHRISTY,12/7/2022 1:48 PM

## 2022-12-15 ENCOUNTER — OFFICE VISIT (OUTPATIENT)
Dept: OBGYN CLINIC | Age: 32
End: 2022-12-15
Payer: COMMERCIAL

## 2022-12-15 ENCOUNTER — TELEPHONE (OUTPATIENT)
Dept: OBGYN CLINIC | Age: 32
End: 2022-12-15

## 2022-12-15 VITALS
DIASTOLIC BLOOD PRESSURE: 82 MMHG | BODY MASS INDEX: 35.89 KG/M2 | WEIGHT: 215.4 LBS | HEIGHT: 65 IN | SYSTOLIC BLOOD PRESSURE: 112 MMHG

## 2022-12-15 DIAGNOSIS — R10.2 PELVIC PAIN: ICD-10-CM

## 2022-12-15 DIAGNOSIS — N94.6 DYSMENORRHEA: ICD-10-CM

## 2022-12-15 DIAGNOSIS — N92.0 MENORRHAGIA WITH REGULAR CYCLE: Primary | ICD-10-CM

## 2022-12-15 PROCEDURE — 99213 OFFICE O/P EST LOW 20 MIN: CPT | Performed by: ADVANCED PRACTICE MIDWIFE

## 2022-12-15 ASSESSMENT — ENCOUNTER SYMPTOMS
GASTROINTESTINAL NEGATIVE: 1
RESPIRATORY NEGATIVE: 1

## 2022-12-15 NOTE — TELEPHONE ENCOUNTER
Please call patient - would like dx lap and chromotubation to check tubes - TTC and rule out endometriosis. Painful heavy cycles.   Aware that scheduled well into 2023

## 2022-12-15 NOTE — PROGRESS NOTES
PROBLEM VISIT     Date of service: 12/15/2022    Cortney Hannah  Is a 34 y.o.  female    PT's PCP is: No primary care provider on file. : 1990                                          HPI Here for USN f/u. USN done due to heavy painful menses. Most recent cycle - day 1 little heavy, day 2 was heavy with \"medium stringy clots\". Lightened over next few days then stopped. Still quite painful. Still actively TTC - does not want to do anything that will effect this. Review of Systems   Constitutional: Negative. HENT: Negative. Respiratory: Negative. Gastrointestinal: Negative. Genitourinary:  Positive for menstrual problem and pelvic pain. Neurological: Negative. Psychiatric/Behavioral: Negative. Patient's last menstrual period was 2022 (exact date). OB History    Para Term  AB Living   1       1     SAB IAB Ectopic Molar Multiple Live Births   1                # Outcome Date GA Lbr Anson/2nd Weight Sex Delivery Anes PTL Lv   1 SAB 2006 8w0d       ND        Social History     Tobacco Use   Smoking Status Every Day    Packs/day: 1.00    Years: 15.00    Pack years: 15.00    Types: Cigarettes   Smokeless Tobacco Never        Social History     Substance and Sexual Activity   Alcohol Use Yes    Alcohol/week: 1.0 - 2.0 standard drink    Types: 1 - 2 Glasses of wine per week    Comment: social- weekends       Allergies: Penicillins      Current Outpatient Medications:     Cholecalciferol (VITAMIN D3 PO), Take by mouth, Disp: , Rfl:     Prenatal MV-Min-Fe Fum-FA-DHA (PRENATAL 1 PO), Take by mouth, Disp: , Rfl:     Social History     Substance and Sexual Activity   Sexual Activity Yes    Partners: Male       Chief Complaint   Patient presents with    Menorrhagia     Pt here for cycle day 9 ultrasound. Pt denies concerns. Physical Exam  Constitutional:       Appearance: Normal appearance. She is obese. HENT:      Head: Normocephalic.    Eyes: Pupils: Pupils are equal, round, and reactive to light. Pulmonary:      Effort: Pulmonary effort is normal.   Musculoskeletal:         General: Normal range of motion. Cervical back: Normal range of motion. Neurological:      Mental Status: She is alert and oriented to person, place, and time. Skin:     General: Skin is warm and dry. Psychiatric:         Behavior: Behavior normal.   Vitals and nursing note reviewed. Vital Signs  Blood pressure 112/82, height 5' 5\" (1.651 m), weight 215 lb 6.4 oz (97.7 kg), last menstrual period 12/06/2022. Results reviewed today:    USN today  Uterus measures 7.0 x 4.6 x 3.7cm  Endometrium 6.5mm  Ovaries normal                              Assessment and Plan          Diagnosis Orders   1. Menorrhagia with regular cycle        2. Dysmenorrhea        3. Pelvic pain            Discussed options for mgmt with patient - spent 20 minutes counseling about options  We discussed all options in detail for mgmt of her cycles - patient declines all hormones due to her desires to conceive  Patient will trial motrin 1-2 days prior to cycle and during first 2 days of cycle to see if helps her s/s. Otherwise she would like to proceed with dx lap and check tubes - rule out tube obstruction and rule out endometriosis. She is aware Jackie Fowler will call her to schedule      I am having Cortney Hannah maintain her Prenatal MV-Min-Fe Fum-FA-DHA (PRENATAL 1 PO) and Cholecalciferol (VITAMIN D3 PO). Return if symptoms worsen or fail to improve, for Yearly. She was also counseled on her preventative health maintenance recommendations and follow-up. There are no Patient Instructions on file for this visit.     DEEPTI Gray CNM,12/15/2022 1:21 PM                     Electronically signed by DEEPTI Gray CNM

## 2022-12-20 NOTE — TELEPHONE ENCOUNTER
Spoke to patient and reviewed surgery expectations and recovery. She is scheduled for a Diagnostic Laparoscopy, lysis of adhesions, ablation of endometriosis and chromopertubation at [facility] on 4/10/2023. She is aware that a nurse from [facility] will call her to complete a phone interview and arrange COVID testing if needed. Patient did receive the COVID vaccine. She had spoken to [facility] and was instructed to plan for 2 weeks off work. 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 I will mail her  a note for work. Patient will come in to see Dr. Sandhya Story prior to surgery and will get labs on admission. We will also follow up 2-4 weeks after surgery. Appointments scheduled. Patient verbalized understanding, no further questions/concerns voiced.

## 2023-01-04 ENCOUNTER — TELEPHONE (OUTPATIENT)
Dept: OBGYN CLINIC | Age: 33
End: 2023-01-04

## 2023-01-04 NOTE — TELEPHONE ENCOUNTER
I completed a PA for patient's upcoming surgery on [employer]. CPT 57855 does not require a PA and reference # is [id number]. CPT 13830 does not require a PA and reference # is [id number].

## 2023-03-30 ENCOUNTER — HOSPITAL ENCOUNTER (OUTPATIENT)
Age: 33
Setting detail: SPECIMEN
Discharge: HOME | End: 2023-03-30

## 2023-03-30 ENCOUNTER — TELEPHONE (OUTPATIENT)
Dept: OBGYN CLINIC | Age: 33
End: 2023-03-30

## 2023-03-30 DIAGNOSIS — N92.6 MENSTRUAL CHANGES: ICD-10-CM

## 2023-03-30 DIAGNOSIS — N92.6 MENSTRUAL CHANGES: Primary | ICD-10-CM

## 2023-03-30 NOTE — TELEPHONE ENCOUNTER
Why don't we have her do an SPT and see what that shows. If negative can do motrin for cramping and this can even help decrease blood flow for heavy menses. If 1 pad/hr then ER    With it being the weekend not sure we can do Aygestin due to timing of results. Could possible get HCG back today if goes to [facility] or another hospital facility. Otherwise keep appt for tomorrow - looks like it is Wednesday not Monday.

## 2023-03-30 NOTE — TELEPHONE ENCOUNTER
Pt is currently working in [place] and will come back to [facility] to get her lab drawn. She states it will be easier than trying to find some place down there. She is aware of recommendation for Motrin if SPT is negative. She is also aware that she should go to ER if saturating 1 pad/hr. She will keep her appt for next week.

## 2023-03-31 LAB — HCG QUANTITATIVE: <1 MIU/ML

## 2023-03-31 SDOH — ECONOMIC STABILITY: FOOD INSECURITY: WITHIN THE PAST 12 MONTHS, THE FOOD YOU BOUGHT JUST DIDN'T LAST AND YOU DIDN'T HAVE MONEY TO GET MORE.: NEVER TRUE

## 2023-03-31 SDOH — ECONOMIC STABILITY: TRANSPORTATION INSECURITY
IN THE PAST 12 MONTHS, HAS LACK OF TRANSPORTATION KEPT YOU FROM MEETINGS, WORK, OR FROM GETTING THINGS NEEDED FOR DAILY LIVING?: NO

## 2023-03-31 SDOH — ECONOMIC STABILITY: HOUSING INSECURITY
IN THE LAST 12 MONTHS, WAS THERE A TIME WHEN YOU DID NOT HAVE A STEADY PLACE TO SLEEP OR SLEPT IN A SHELTER (INCLUDING NOW)?: NO

## 2023-03-31 SDOH — ECONOMIC STABILITY: INCOME INSECURITY: HOW HARD IS IT FOR YOU TO PAY FOR THE VERY BASICS LIKE FOOD, HOUSING, MEDICAL CARE, AND HEATING?: NOT HARD AT ALL

## 2023-03-31 SDOH — ECONOMIC STABILITY: FOOD INSECURITY: WITHIN THE PAST 12 MONTHS, YOU WORRIED THAT YOUR FOOD WOULD RUN OUT BEFORE YOU GOT MONEY TO BUY MORE.: NEVER TRUE

## 2023-04-03 ENCOUNTER — OFFICE VISIT (OUTPATIENT)
Dept: OBGYN | Age: 33
End: 2023-04-03
Payer: COMMERCIAL

## 2023-04-03 VITALS
SYSTOLIC BLOOD PRESSURE: 122 MMHG | WEIGHT: 207 LBS | HEIGHT: 65 IN | BODY MASS INDEX: 34.49 KG/M2 | DIASTOLIC BLOOD PRESSURE: 84 MMHG

## 2023-04-03 DIAGNOSIS — N94.6 DYSMENORRHEA: ICD-10-CM

## 2023-04-03 DIAGNOSIS — N92.0 MENORRHAGIA WITH REGULAR CYCLE: Primary | ICD-10-CM

## 2023-04-03 PROCEDURE — 99213 OFFICE O/P EST LOW 20 MIN: CPT | Performed by: OBSTETRICS & GYNECOLOGY

## 2023-04-03 NOTE — PROGRESS NOTES
BMI 34.45 kg/m²   Physical Exam  Constitutional:       Appearance: Normal appearance. HENT:      Head: Normocephalic and atraumatic. Eyes:      Extraocular Movements: Extraocular movements intact. Pupils: Pupils are equal, round, and reactive to light. Cardiovascular:      Rate and Rhythm: Normal rate. Pulmonary:      Effort: Pulmonary effort is normal.   Neurological:      Mental Status: She is alert and oriented to person, place, and time. Skin:     General: Skin is warm and dry. Psychiatric:         Mood and Affect: Mood normal.         Behavior: Behavior normal.     The patient, Parker Nieves is a 34 y.o. female, was seen with a total time spent of 20 minutes for the visit on this date of service by the E/M provider. The time component had both face to face and non face to face time spent in determining the total time component. Counseling and education regarding her diagnosis listed below and her options regarding those diagnoses were also included in determining her time component. Diagnosis Orders   1. Menorrhagia with regular cycle        2. Dysmenorrhea             The patient had her preventative health maintenance recommendations and follow-up reviewed with her at the completion of her visit. ASSESSMENT:      1. Menorrhagia with regular cycle    2. Dysmenorrhea        PLAN:  No orders of the defined types were placed in this encounter. Return in about 1 week (around 4/10/2023) for dx lap with lysis of adhesions, ablation of endometriosis, chromopertubation.        Electronically signed by Mark Mckee DO on 04/03/23

## 2023-04-11 ENCOUNTER — PATIENT MESSAGE (OUTPATIENT)
Dept: OBGYN CLINIC | Age: 33
End: 2023-04-11

## 2023-04-19 ENCOUNTER — TELEPHONE (OUTPATIENT)
Dept: OBGYN CLINIC | Age: 33
End: 2023-04-19

## 2023-04-19 NOTE — TELEPHONE ENCOUNTER
Patient left a message on my voicemail questioning if her FMLA could be extended beyond her post-op visit on 5/2. She had a Diagnostic Laparoscopy on 4/10 with Dr. Margot Mart. Originally she was given 2 weeks off work due to her strenuous job, but she is hesitant to return to work on 4/24. I attempted to call her back and had to leave a message. Explained that I cannot extend FMLA without an appointment. We need to physically see patient, update vitals, and document why her leave needs to be extended. Instructed patient to call back to schedule an appointment.

## 2023-04-20 ENCOUNTER — OFFICE VISIT (OUTPATIENT)
Dept: OBGYN CLINIC | Age: 33
End: 2023-04-20
Payer: COMMERCIAL

## 2023-04-20 VITALS — SYSTOLIC BLOOD PRESSURE: 122 MMHG | BODY MASS INDEX: 34.28 KG/M2 | WEIGHT: 206 LBS | DIASTOLIC BLOOD PRESSURE: 70 MMHG

## 2023-04-20 DIAGNOSIS — R39.15 URGENCY OF MICTURITION: Primary | ICD-10-CM

## 2023-04-20 DIAGNOSIS — Z09 POSTOPERATIVE EXAMINATION: ICD-10-CM

## 2023-04-20 LAB
BILIRUBIN, POC: NEGATIVE
BLOOD URINE, POC: NEGATIVE
CLARITY, POC: NORMAL
COLOR, POC: NORMAL
GLUCOSE URINE, POC: NEGATIVE
KETONES, POC: NORMAL
LEUKOCYTE EST, POC: NEGATIVE
NITRITE, POC: NEGATIVE
PH, POC: 6
PROTEIN, POC: NEGATIVE
SPECIFIC GRAVITY, POC: 1.02
UROBILINOGEN, POC: 0.2

## 2023-04-20 PROCEDURE — 81002 URINALYSIS NONAUTO W/O SCOPE: CPT

## 2023-04-20 PROCEDURE — 99024 POSTOP FOLLOW-UP VISIT: CPT

## 2023-04-24 NOTE — PROGRESS NOTES
Postop Progress Note    Subjective    Cortney Hannah presents to the office for postop follow up. Chief Complaint   Patient presents with    Post-Op Check     Patient had WILLIE arechiga, ablation of endometriosis on 4-. Patient reporst still feeling tender in abdomen, \"like pulled muscles\". She is supposed to go back to work on Monday but wants to extend it another week. She does a lot of pushing/pulling at work. Patient reports that she is unable to feel the urge to urinate until she \"Really\" has to go to the bathroom and feels it higher in abdomen. She can feel the urination sensation more this week than last week, last week she couldn't feel it at      Objective    Vitals:    04/20/23 1137   BP: 122/70     General: alert, cooperative and no distress  Incision: healing well    Assessment  Doing well postoperatively. Reports ongoing tenderness in abdomen that has caused urinary urgency - was suddenly getting urge to urinate without any warning. This is improving but she is scheduled to go back to work on Monday and is concerned about the lifting, pushing, pulling that she has to do. Discussed one more week off work and importance of resting during this time. Patient is currently pursuing RE/IVF tx and will request records if needed. Plan  1. Continue any current medications  2. Wound care discussed  3. Pt is to increase activities as tolerated  4. Usual diet  5.  Follow up: as needed    Electronically signed by Kenna Manzano PA-C on 4/24/2023 at 8:30 AM

## 2023-08-02 ENCOUNTER — HOSPITAL ENCOUNTER (OUTPATIENT)
Age: 33
Discharge: HOME | End: 2023-08-02
Payer: COMMERCIAL

## 2023-08-02 LAB
B-HCG SERPL EIA 3RD IS-ACNC: <1 MIU/ML
ESTRADIOL LEVEL: 828.9 PG/ML (ref 27–314)
FSH SERPL-ACNC: 6.4 MIU/ML (ref 1.7–21.5)
LH SERPL-ACNC: 23.8 MIU/ML (ref 1–95.6)
PROGEST SERPL-MCNC: 0.31 NG/ML
TSH SERPL DL<=0.05 MIU/L-ACNC: 1.53 UIU/ML (ref 0.3–5)

## 2023-08-02 PROCEDURE — 84443 ASSAY THYROID STIM HORMONE: CPT

## 2023-08-02 PROCEDURE — 83002 ASSAY OF GONADOTROPIN (LH): CPT

## 2023-08-02 PROCEDURE — 84702 CHORIONIC GONADOTROPIN TEST: CPT

## 2023-08-02 PROCEDURE — 82670 ASSAY OF TOTAL ESTRADIOL: CPT

## 2023-08-02 PROCEDURE — 84144 ASSAY OF PROGESTERONE: CPT

## 2023-08-02 PROCEDURE — 83001 ASSAY OF GONADOTROPIN (FSH): CPT

## 2023-08-02 PROCEDURE — 36415 COLL VENOUS BLD VENIPUNCTURE: CPT

## 2023-08-21 NOTE — TELEPHONE ENCOUNTER
Please apologize to patient about needing to reschedule from today -   Lets get her annual rescheduled and due to the menses changes will need a day 7-9 pelvic USN and f/u. We will discuss findings, treatment options and possible diagnoses at that time. Endometriosis is something to consider but these s/s can also be from other causes.   In addition - endometriosis treatment - definitive is surgery but there are treatment options to consider inguinal hernia repair

## 2024-01-08 ENCOUNTER — OFFICE VISIT (OUTPATIENT)
Dept: OBGYN CLINIC | Age: 34
End: 2024-01-08
Payer: COMMERCIAL

## 2024-01-08 ENCOUNTER — HOSPITAL ENCOUNTER (OUTPATIENT)
Age: 34
Setting detail: SPECIMEN
Discharge: HOME | End: 2024-01-08

## 2024-01-08 VITALS
SYSTOLIC BLOOD PRESSURE: 112 MMHG | BODY MASS INDEX: 36.46 KG/M2 | WEIGHT: 218.8 LBS | DIASTOLIC BLOOD PRESSURE: 80 MMHG | HEIGHT: 65 IN

## 2024-01-08 DIAGNOSIS — Z01.419 SMEAR, VAGINAL, AS PART OF ROUTINE GYNECOLOGICAL EXAMINATION: Primary | ICD-10-CM

## 2024-01-08 DIAGNOSIS — Z12.4 SCREENING FOR CERVICAL CANCER: ICD-10-CM

## 2024-01-08 DIAGNOSIS — Z12.72 SMEAR, VAGINAL, AS PART OF ROUTINE GYNECOLOGICAL EXAMINATION: Primary | ICD-10-CM

## 2024-01-08 DIAGNOSIS — N92.6 MENSTRUAL CHANGES: ICD-10-CM

## 2024-01-08 PROCEDURE — 99395 PREV VISIT EST AGE 18-39: CPT | Performed by: ADVANCED PRACTICE MIDWIFE

## 2024-01-08 PROCEDURE — 99213 OFFICE O/P EST LOW 20 MIN: CPT | Performed by: ADVANCED PRACTICE MIDWIFE

## 2024-01-08 RX ORDER — CITALOPRAM 20 MG/1
20 TABLET ORAL DAILY
COMMUNITY
Start: 2024-01-08

## 2024-01-08 RX ORDER — FLUTICASONE PROPIONATE 50 MCG
1-2 SPRAY, SUSPENSION (ML) NASAL DAILY
COMMUNITY
Start: 2024-01-08 | End: 2025-01-07

## 2024-01-08 ASSESSMENT — ENCOUNTER SYMPTOMS
CONSTIPATION: 0
SHORTNESS OF BREATH: 0
DIARRHEA: 0
GASTROINTESTINAL NEGATIVE: 1
ABDOMINAL PAIN: 0
RESPIRATORY NEGATIVE: 1

## 2024-01-08 NOTE — PROGRESS NOTES
Chaperone for Intimate Exam  Chaperone was offered as part of the rooming process. Patient declined and agrees to continue with exam without a chaperone.       
  Musculoskeletal: Negative.    Neurological: Negative.  Negative for dizziness, light-headedness and headaches.   Psychiatric/Behavioral: Negative.           Objective  Blood pressure 112/80, height 1.651 m (5' 5\"), weight 99.2 kg (218 lb 12.8 oz), last menstrual period 12/15/2023.  Physical Exam  Constitutional:       Appearance: Normal appearance. She is obese.   Genitourinary:      Vulva, bladder and urethral meatus normal.      No vaginal discharge or tenderness.      No vaginal prolapse present.       Right Adnexa: not tender.     Left Adnexa: not tender.     Cervical friability present.      No cervical motion tenderness or discharge.      Uterus is not tender.      Pelvic exam was performed with patient in the lithotomy position.   Breasts:     Breasts are symmetrical.      Breasts are soft.     Right: No mass, nipple discharge, skin change or tenderness.      Left: No mass, nipple discharge, skin change or tenderness.   HENT:      Head: Normocephalic.   Eyes:      Pupils: Pupils are equal, round, and reactive to light.   Cardiovascular:      Rate and Rhythm: Normal rate and regular rhythm.      Pulses: Normal pulses.      Heart sounds: Normal heart sounds. No murmur heard.  Pulmonary:      Effort: Pulmonary effort is normal.      Breath sounds: Normal breath sounds. No wheezing.   Abdominal:      Palpations: Abdomen is soft.      Tenderness: There is no abdominal tenderness.   Musculoskeletal:         General: Normal range of motion.      Cervical back: Normal range of motion. No muscular tenderness.   Neurological:      Mental Status: She is alert and oriented to person, place, and time.   Skin:     General: Skin is warm and dry.   Psychiatric:         Behavior: Behavior normal.   Vitals and nursing note reviewed. Chaperone present: Declined.                                 Assessment and Plan          Diagnosis Orders   1. Smear, vaginal, as part of routine gynecological examination        2. Menstrual 
No deformity or limitation of movement

## 2024-01-11 LAB
HPV I/H RISK 4 DNA CVX QL NAA+PROBE: NOT DETECTED
HPV SAMPLE: NORMAL
HPV, INTERPRETATION: NORMAL
HPV16 DNA CVX QL NAA+PROBE: NOT DETECTED
HPV18 DNA CVX QL NAA+PROBE: NOT DETECTED
SPECIMEN DESCRIPTION: NORMAL

## 2024-01-15 ENCOUNTER — OFFICE VISIT (OUTPATIENT)
Dept: OBGYN CLINIC | Age: 34
End: 2024-01-15
Payer: COMMERCIAL

## 2024-01-15 VITALS
WEIGHT: 217.5 LBS | SYSTOLIC BLOOD PRESSURE: 110 MMHG | BODY MASS INDEX: 36.24 KG/M2 | HEIGHT: 65 IN | DIASTOLIC BLOOD PRESSURE: 72 MMHG

## 2024-01-15 DIAGNOSIS — N92.6 MENSTRUAL CHANGES: Primary | ICD-10-CM

## 2024-01-15 PROCEDURE — 99213 OFFICE O/P EST LOW 20 MIN: CPT | Performed by: ADVANCED PRACTICE MIDWIFE

## 2024-01-15 ASSESSMENT — ENCOUNTER SYMPTOMS
GASTROINTESTINAL NEGATIVE: 1
RESPIRATORY NEGATIVE: 1

## 2024-01-15 NOTE — PROGRESS NOTES
PROBLEM VISIT     Date of service: 1/15/2024    Cortney Hannah  Is a 33 y.o.  female    PT's PCP is: No primary care provider on file.     : 1990                                           HPI Here for USN f/u.  USN was ordered due to cycle changes.  At annual reported that cycles are very regular but minimal bleeding and lasts approx 1-3 day and patient worried about this.     Review of Systems   Constitutional: Negative.    HENT: Negative.     Respiratory: Negative.     Gastrointestinal: Negative.    Genitourinary:  Positive for menstrual problem (light and only one day). Negative for pelvic pain.   Neurological: Negative.    Psychiatric/Behavioral: Negative.         Patient's last menstrual period was 2024 (exact date).   OB History    Para Term  AB Living   1       1     SAB IAB Ectopic Molar Multiple Live Births   1                # Outcome Date GA Lbr Anson/2nd Weight Sex Delivery Anes PTL Lv   1 SAB 2006 8w0d       ND        Social History     Tobacco Use   Smoking Status Every Day    Current packs/day: 1.00    Average packs/day: 1 pack/day for 15.0 years (15.0 ttl pk-yrs)    Types: Cigarettes   Smokeless Tobacco Never        Social History     Substance and Sexual Activity   Alcohol Use Yes    Alcohol/week: 1.0 - 2.0 standard drink of alcohol    Types: 1 - 2 Glasses of wine per week    Comment: social- weekends       Allergies: Penicillins      Current Outpatient Medications:     citalopram (CELEXA) 20 MG tablet, Take 1 tablet by mouth daily, Disp: , Rfl:     fluticasone (FLONASE) 50 MCG/ACT nasal spray, 1-2 sprays by Nasal route daily, Disp: , Rfl:     Social History     Substance and Sexual Activity   Sexual Activity Yes    Partners: Male       Chief Complaint   Patient presents with    Irregular Menses     Pt here for USN f/u. Denies any new concerns.        Physical Exam  Constitutional:       Appearance: Normal appearance. She is obese.   HENT:      Head:

## 2024-01-20 LAB — CYTOLOGY REPORT: NORMAL

## 2025-01-07 ASSESSMENT — ENCOUNTER SYMPTOMS
CONSTIPATION: 0
SHORTNESS OF BREATH: 0
RESPIRATORY NEGATIVE: 1
DIARRHEA: 0
GASTROINTESTINAL NEGATIVE: 1
ABDOMINAL PAIN: 0

## 2025-01-07 NOTE — PROGRESS NOTES
YEARLY PHYSICAL    Date of service: 2025    Cortney Hannah  Is a 34 y.o.   female    PT's PCP is: No primary care provider on file.     : 1990                                             Subjective:       Patient's last menstrual period was 2025 (exact date).     Are your menses regular: yes    OB History    Para Term  AB Living   1       1     SAB IAB Ectopic Molar Multiple Live Births   1                # Outcome Date GA Lbr Anson/2nd Weight Sex Type Anes PTL Lv   1 SAB 2006 8w0d       ND        Social History     Tobacco Use   Smoking Status Every Day    Current packs/day: 1.00    Average packs/day: 1 pack/day for 15.0 years (15.0 ttl pk-yrs)    Types: Cigarettes   Smokeless Tobacco Never        Social History     Substance and Sexual Activity   Alcohol Use Yes    Alcohol/week: 1.0 - 2.0 standard drink of alcohol    Types: 1 - 2 Glasses of wine per week    Comment: social- weekends       Family History   Problem Relation Age of Onset    Cervical Cancer Mother     Depression Mother     Mental Illness Mother     Stroke Paternal Grandfather     Heart Attack Paternal Uncle        Any family history of breast or ovarian cancer: No    Any family history of blood clots: No      Allergies: Penicillins      Current Outpatient Medications:     citalopram (CELEXA) 20 MG tablet, Take 1 tablet by mouth daily (Patient not taking: Reported on 2025), Disp: , Rfl:     fluticasone (FLONASE) 50 MCG/ACT nasal spray, 1-2 sprays by Nasal route daily, Disp: , Rfl:     Social History     Substance and Sexual Activity   Sexual Activity Yes    Partners: Male       Any bleeding or pain with intercourse: No    Last Yearly:      Last pap:  - normal    Last HPV:  - negative    Do you do self breast exams: Encouraged    Past Medical History:   Diagnosis Date    Abnormal Pap smear of cervix     LSIL 2016, COLPO 2017 SHARI

## 2025-01-09 ENCOUNTER — OFFICE VISIT (OUTPATIENT)
Dept: OBGYN CLINIC | Age: 35
End: 2025-01-09
Payer: COMMERCIAL

## 2025-01-09 VITALS
SYSTOLIC BLOOD PRESSURE: 110 MMHG | DIASTOLIC BLOOD PRESSURE: 70 MMHG | BODY MASS INDEX: 36.65 KG/M2 | WEIGHT: 220 LBS | HEIGHT: 65 IN

## 2025-01-09 DIAGNOSIS — Z12.72 SMEAR, VAGINAL, AS PART OF ROUTINE GYNECOLOGICAL EXAMINATION: Primary | ICD-10-CM

## 2025-01-09 DIAGNOSIS — Z01.419 SMEAR, VAGINAL, AS PART OF ROUTINE GYNECOLOGICAL EXAMINATION: Primary | ICD-10-CM

## 2025-01-09 PROCEDURE — 99395 PREV VISIT EST AGE 18-39: CPT | Performed by: ADVANCED PRACTICE MIDWIFE

## 2025-01-09 SDOH — ECONOMIC STABILITY: FOOD INSECURITY: WITHIN THE PAST 12 MONTHS, THE FOOD YOU BOUGHT JUST DIDN'T LAST AND YOU DIDN'T HAVE MONEY TO GET MORE.: NEVER TRUE

## 2025-01-09 SDOH — ECONOMIC STABILITY: FOOD INSECURITY: WITHIN THE PAST 12 MONTHS, YOU WORRIED THAT YOUR FOOD WOULD RUN OUT BEFORE YOU GOT MONEY TO BUY MORE.: NEVER TRUE
